# Patient Record
Sex: MALE | Race: WHITE | NOT HISPANIC OR LATINO | Employment: OTHER | URBAN - METROPOLITAN AREA
[De-identification: names, ages, dates, MRNs, and addresses within clinical notes are randomized per-mention and may not be internally consistent; named-entity substitution may affect disease eponyms.]

---

## 2018-02-09 ENCOUNTER — TRANSFERRED RECORDS (OUTPATIENT)
Dept: HEALTH INFORMATION MANAGEMENT | Facility: CLINIC | Age: 58
End: 2018-02-09

## 2018-02-22 ENCOUNTER — OFFICE VISIT (OUTPATIENT)
Dept: OTOLARYNGOLOGY | Facility: CLINIC | Age: 58
End: 2018-02-22
Payer: COMMERCIAL

## 2018-02-22 DIAGNOSIS — L98.9 SKIN LESION: Primary | ICD-10-CM

## 2018-02-22 PROCEDURE — 99204 OFFICE O/P NEW MOD 45 MIN: CPT | Performed by: OTOLARYNGOLOGY

## 2018-02-22 RX ORDER — METRONIDAZOLE 10 MG/G
GEL TOPICAL DAILY
COMMUNITY

## 2018-02-22 ASSESSMENT — PAIN SCALES - GENERAL: PAINLEVEL: NO PAIN (0)

## 2018-02-22 NOTE — NURSING NOTE
Lopez Olmos's goals for this visit include:   Chief Complaint   Patient presents with     Lesion     possible lipoma of forehead       He requests these members of his care team be copied on today's visit information: No Ref-Primary, Physician      PCP: No Ref-Primary, Physician    Referring Provider:  No referring provider defined for this encounter.    Chief Complaint   Patient presents with     Lesion     possible lipoma of forehead       Initial There were no vitals taken for this visit. There is no height or weight on file to calculate BMI.  Medication Reconciliation: complete    Do you need any medication refills at today's visit? No    Moraima Patrick RN

## 2018-02-22 NOTE — MR AVS SNAPSHOT
After Visit Summary   2/22/2018    Lopez Olmos    MRN: [id number]           Patient Information     Date Of Birth          1960        Visit Information        Provider Department      2/22/2018 10:30 AM Richie Simpson MD [facility]        Today's Diagnoses     Skin lesion    -  1       Follow-ups after your visit        Your next 10 appointments already scheduled     Feb 23, 2018  4:00 PM CST   (Arrive by 3:45 PM)   CT HEAD W/O & W CONTRAST with SHCT2   [facility] ([facility])    [address] [place] [id number]   [phone]           Please bring any scans or X-rays taken at other hospitals, if similar tests were done. Also bring a list of your medicines, including vitamins, minerals and over-the-counter drugs. It is safest to leave personal items at home.  Be sure to tell your doctor:   If you have any allergies.   If there s any chance you are pregnant.   If you are breastfeeding.    If you have diabetes as your medication may need to be adjusted for this exam.  You will have contrast for this exam. To prepare:   Do not eat or drink for 2 hours before your exam. If you need to take medicine, you may take it with small sips of water. (We may ask you to take liquid medicine as well.)   The day before your exam, drink extra fluids at least six 8-ounce glasses (unless your doctor tells you to restrict your fluids).  Patients over 70 or patients with diabetes or kidney problems:   If you haven t had a blood test (creatinine test) within the last 30 days, the Cardiologist/Radiologist may require you to get this test prior to your exam.  Please wear loose clothing, such as a sweat suit or jogging clothes. Avoid snaps, zippers and other metal. We may ask you to undress and put on a hospital gown.  If you have any questions, please call the Imaging Department where you will have your exam.            Mar 01, 2018 12:45 PM CST   Return  Visit with Richie Simpson MD   [facility] ([facility]    [address] [place] [id number] [phone]              Future tests that were ordered for you today     Open Future Orders        Priority Expected Expires Ordered    CT Head w/o & w Contrast Routine 2018            Who to contact     If you have questions or need follow up information about today's clinic visit or your schedule please contact [facility] directly at [phone].  Normal or non-critical lab and imaging results will be communicated to you by MyChart, letter or phone within 4 business days after the clinic has received the results. If you do not hear from us within 7 days, please contact the clinic through [employer] or phone. If you have a critical or abnormal lab result, we will notify you by phone as soon as possible.  Submit refill requests through e-channel or call your pharmacy and they will forward the refill request to us. Please allow 3 business days for your refill to be completed.          Additional Information About Your Visit        [email or web address] Information     e-channel is an electronic gateway that provides easy, online access to your medical records. With e-channel, you can request a clinic appointment, read your test results, renew a prescription or communicate with your care team.     To sign up for e-channel visit the website at [email or web address]   You will be asked to enter the access code listed below, as well as some personal information. Please follow the directions to create your username and password.     Your access code is: [email or web address]  Expires: 2018 11:33 AM     Your access code will  in 90 days. If you need help or a new code, please contact your [facility] or call [phone] for assistance.        Care EveryWhere ID     This is your Care EveryWhere ID. This could be used by  other organizations to access your [facility] medical records  [id number]         Blood Pressure from Last 3 Encounters:   No data found for BP    Weight from Last 3 Encounters:   No data found for Wt               Primary Care Provider Fax #    Physician No Ref-Primary [phone]       No address on file        Equal Access to Services     MACARIO [place] : Hadii aad ku hadkodyo Soomaali, waaxda luqadaha, qaybta kaalmada adeegyada, waxbenito leinin hayaan aderafia peters laritikarogelio . So [facility] [phone].    ATENCIÓN: Si habla español, tiene a horowitz disposición servicios gratuitos de asistencia lingüística. Llame al [phone].    We comply with applicable federal civil rights laws and [place] laws. We do not discriminate on the basis of race, color, national origin, age, disability, sex, sexual orientation, or gender identity.            Thank you!     Thank you for choosing [facility]  for your care. Our goal is always to provide you with excellent care. Hearing back from our patients is one way we can continue to improve our services. Please take a few minutes to complete the written survey that you may receive in the mail after your visit with us. Thank you!             Your Updated Medication List - Protect others around you: Learn how to safely use, store and throw away your medicines at www.disposemymeds.org.          This list is accurate as of 2/22/18 11:45 AM.  Always use your most recent med list.                   Brand Name Dispense Instructions for use Diagnosis    ASPIRIN PO           METROGEL 1 % gel   Generic drug:  metroNIDAZOLE      Apply topically daily        MINOCYCLINE HCL PO

## 2018-02-22 NOTE — LETTER
2/22/2018         RE: Lopez Olmos  [address] [address]        Dear Colleague,    Thank you for referring your patient, Lopez Oloms, to the [facility]. Please see a copy of my visit note below.    David Mustafa MD  [facility]  [address], [address]  [place] [address]    Dear Doctor Moon,    Thank you for asking me to see your patient, . Lopez Olmos, in consultation to evaluate his forehead lesion.  Today I had the pleasure of seeing him at the Facial Plastic and Reconstructive Surgery Clinic in the Department of Otolaryngology at [facility].    CLINICAL SUMMARY:   Diagnoses:  Right lateral forehead mass, possible lipoma or cyst.    Comorbidities: rosacea  Pertinent medications: ASA prn pain only  Photographs: [facility] consents signed February 22, 2018.   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    Care Checklist:   _CT face to determine the depth of the lesion. I was uncertain on exam whether it was attached to the underlying deep tissue and if this mass had a deeper extension.   _RTC after CT scan.       MEDICAL DECISION MAKING:   I recommend a CT scan of the mass for surgical planning to determine the depth of the lesion and further understand the risk to the facial nerve. Because it is growing, I also recommend excision of this lesion for both diagnostic and treatment purposes because of clinical uncertainty as to the exact diagnosis and recent growth.  An extensive preprocedure discussion was held.  Mr. Olmos agrees with this plan. He will undergo the CT scan and return to see me in the office to review the results and finalize our treatment plan.    It has been a pleasure to participate in the care of Mr. Olmos.  Thank you for this kind referral.      Sincerely,    Richie Simpson MD    Division of Facial Plastic and Reconstructive Surgery,   Department of  Otolaryngology  [facility]   ______________________________________________________________________                HISTORY OF PRESENT ILLNESS and SKIN LESION QUESTIONAAIRE:  As you know, Mr. Olmos is an 58 year old-year-old male who presents with a skin lesion located on the right  forehead.   He first noticed this lesion 2 years ago.    Previous biopsy of this lesion: I & D of lesion- no drainage.     Bleeding: none.   Provider- Bleeding: none.     Pain: yes- with rosacea flare.  Provider- Pain: yes when his rosacea flares up.    Color change: none.   Provider- Color change: none.     Growth: has grown 25% since first noticed.   Provider- Growth: has grown significantly over the last 2 years.     Ulceration: none.   Itching: none.  Purulence: none.   Oozing: none.   Edema: none.   Erythema: none.   History of rupture: none.   Recurrent physical trauma: none.     For lesions near the eyelid:   Restricts eyelid function: none.         SKIN QUESTIONNAIRE:   Have you had a lot of sun exposure in the past? No    Do you remember blistering sunburns anywhere on your body? Yes  Do you currently smoke?No  Provider- Do you currently smoke?No    Have you smoked in the past?No  Have you had previous skin cancers? No  Provider- Have you had previous skin cancers? No    Family history of skin cancer?Yes      REVIEW OF SYSTEMS: a 12 system review was performed by the patient care staff:    Do you currently have or have you ever had in the past:    No. Complications with sedation or anesthesia.  No. Use blood thinners. No   No. Any heart problems.   No. Chest pain.   No. A pacemaker.  No. Problems with excessive bleeding or a bleeding disorder.  No. Problems with blood clots or a clotting disorder.   No. Sleep apnea or sleep with a CPAP machine.       No. Excessive scarring.   No. Night sweats.   No. Fevers.   No. Double vision.   No. Vision loss.   No. Snoring.   No. Difficulty breathing through your nose.   No.  Runny nose.   No. Sneezing.   No. Itchy eyes.   No. Itchy throat.   No. Face pain.   No. Face weakness.   No. Face numbness.   No. Difficulty swallowing.   No. Pain with swallowing.,   No. Difficulty hearing or hearing loss.   No. Difficulty urinating.   No. Anxiety.   No. Depression.     FAMILY HISTORY:  No. Family history of excessive bleeding or a bleeding disorder.   No. Family history of blood clots or a clotting disorder.     Yes. Family history of skin cancer.    History reviewed. No pertinent family history.    PAST MEDICAL HISTORY:   Past Medical History:   Diagnosis Date     Rosacea        PAST SURGICAL HISTORY:   Past Surgical History:   Procedure Laterality Date     ORTHOPEDIC SURGERY      shoulder reconstruction       SOCIAL HISTORY:   Social History   Substance Use Topics     Smoking status: Never Smoker     Smokeless tobacco: Never Used     Alcohol use Not on file       ALLERGIES: Review of patient's allergies indicates no known allergies.    MEDICATIONS:   Current Outpatient Prescriptions   Medication Sig Dispense Refill     MINOCYCLINE HCL PO        metroNIDAZOLE (METROGEL) 1 % gel Apply topically daily       ASPIRIN PO            Patient Care Staff Signature: Moraima Patrick RN  ______________________________________________    Provider- Review of systems, FH, PMH, PSH, SH, ALL, and Medications taken by the patient care staff was reviewed by me: Richie Simpson      Provider- PHYSICAL EXAMINATION:  CONSTITUTIONAL:  No apparent distress.  Pleasant affect.  Normal ability to communicate.  CRANIOFACIAL:  Normocephalic, atraumatic.    SKIN:   location:  right  lateral forehead.  size: [id number].  height: raised.  color. Normal overlying skin. It seems to be attached to the overlying skin but also has some attachment to the deeper structures. I am unsure of the layer this lesion resides. It is non-tender.   borders: smooth.  ulceration: absent.  bleeding: absent.    EYES:  Extraocular muscles  intact.  EARS:  Normal auricles.   NOSE: No external nasal valve collapse.  Slight septal deviation.  No polyps or purulence.  ORAL CAVITY AND OROPHARYNX: no lesions on inspection.  NECK:  The parotid is soft, without masses.  Supple laryngeal landmarks.  LYMPHATIC:  No palpable lymphadenopathy.  CARDIOVASCULAR:  Carotid pulses are palpable bilaterally.  NEUROLOGIC:  Facial nerve intact.  RESPIRATORY:  Normal respiratory effort.  No stridor.  Voice strong.        Provider- PREPROCEDURE COUNSELING FOR LESION EXCISION AND CLOSURE:  An extensive preoperative discussion was held.  The patient stated he understood the risks, benefits, alternatives and limitations of the procedure.  The risks were discussed, including but not limited to: bleeding, infection, damage to surrounding structures, weakness, numbness, chronic pain, unfavorable change in his appearance, partial or total skin loss, widening of his scar, excessive scarring, extruded sutures, positive margins requiring further resection, discovery of a malignancy requiring treatment and unforeseen complications related to the procedure or anesthesia.  I described the limitation of this procedure in that a permenant scar at the surgery site(s) will always be present. The scar will be at least 3 times longer than the length of the lesion. I described how the scar will be red for many months and will hopefully fade over time.  He also understands that there may be distortion of his surrounding anatomy including his eyebrow. I emphasized the possibility of facial nerve injury and brow ptosis. The patient stated he understood these risks and limitations and elects to proceed with surgery.  He also stated he had his questions answered to his satisfaction.          Again, thank you for allowing me to participate in the care of your patient.        Sincerely,        Richie Simpson MD

## 2018-02-22 NOTE — PROGRESS NOTES
David Mustafa MD  [facility]  [address], [address]  [place] [id number]    Dear Doctor Moon,    Thank you for asking me to see your patient, . Lopez Olmos, in consultation to evaluate his forehead lesion.  Today I had the pleasure of seeing him at the Facial Plastic and Reconstructive Surgery Clinic in the Department of Otolaryngology at [facility].    CLINICAL SUMMARY:   Diagnoses:  Right lateral forehead mass, possible lipoma or cyst.    Comorbidities: rosacea  Pertinent medications: ASA prn pain only  Photographs: [facility] consents signed February 22, 2018.   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    Care Checklist:   _CT face to determine the depth of the lesion. I was uncertain on exam whether it was attached to the underlying deep tissue and if this mass had a deeper extension.   _RTC after CT scan.       MEDICAL DECISION MAKING:   I recommend a CT scan of the mass for surgical planning to determine the depth of the lesion and further understand the risk to the facial nerve. Because it is growing, I also recommend excision of this lesion for both diagnostic and treatment purposes because of clinical uncertainty as to the exact diagnosis and recent growth.  An extensive preprocedure discussion was held.  Mr. Olmos agrees with this plan. He will undergo the CT scan and return to see me in the office to review the results and finalize our treatment plan.    It has been a pleasure to participate in the care of Mr. Olmos.  Thank you for this kind referral.      Sincerely,    Richie Simpson MD    Division of Facial Plastic and Reconstructive Surgery,   Department of Otolaryngology  [facility]   ______________________________________________________________________                HISTORY OF PRESENT ILLNESS and SKIN LESION QUESTIONAAIRE:  As you know, Mr. Olmos is an 58 year old-year-old male who  presents with a skin lesion located on the right  forehead.   He first noticed this lesion 2 years ago.    Previous biopsy of this lesion: I & D of lesion- no drainage.     Bleeding: none.   Provider- Bleeding: none.     Pain: yes- with rosacea flare.  Provider- Pain: yes when his rosacea flares up.    Color change: none.   Provider- Color change: none.     Growth: has grown 25% since first noticed.   Provider- Growth: has grown significantly over the last 2 years.     Ulceration: none.   Itching: none.  Purulence: none.   Oozing: none.   Edema: none.   Erythema: none.   History of rupture: none.   Recurrent physical trauma: none.     For lesions near the eyelid:   Restricts eyelid function: none.         SKIN QUESTIONNAIRE:   Have you had a lot of sun exposure in the past? No    Do you remember blistering sunburns anywhere on your body? Yes  Do you currently smoke?No  Provider- Do you currently smoke?No    Have you smoked in the past?No  Have you had previous skin cancers? No  Provider- Have you had previous skin cancers? No    Family history of skin cancer?Yes      REVIEW OF SYSTEMS: a 12 system review was performed by the patient care staff:    Do you currently have or have you ever had in the past:    No. Complications with sedation or anesthesia.  No. Use blood thinners. No   No. Any heart problems.   No. Chest pain.   No. A pacemaker.  No. Problems with excessive bleeding or a bleeding disorder.  No. Problems with blood clots or a clotting disorder.   No. Sleep apnea or sleep with a CPAP machine.       No. Excessive scarring.   No. Night sweats.   No. Fevers.   No. Double vision.   No. Vision loss.   No. Snoring.   No. Difficulty breathing through your nose.   No. Runny nose.   No. Sneezing.   No. Itchy eyes.   No. Itchy throat.   No. Face pain.   No. Face weakness.   No. Face numbness.   No. Difficulty swallowing.   No. Pain with swallowing.,   No. Difficulty hearing or hearing loss.   No. Difficulty  urinating.   No. Anxiety.   No. Depression.     FAMILY HISTORY:  No. Family history of excessive bleeding or a bleeding disorder.   No. Family history of blood clots or a clotting disorder.     Yes. Family history of skin cancer.    History reviewed. No pertinent family history.    PAST MEDICAL HISTORY:   Past Medical History:   Diagnosis Date     Rosacea        PAST SURGICAL HISTORY:   Past Surgical History:   Procedure Laterality Date     ORTHOPEDIC SURGERY      shoulder reconstruction       SOCIAL HISTORY:   Social History   Substance Use Topics     Smoking status: Never Smoker     Smokeless tobacco: Never Used     Alcohol use Not on file       ALLERGIES: Review of patient's allergies indicates no known allergies.    MEDICATIONS:   Current Outpatient Prescriptions   Medication Sig Dispense Refill     MINOCYCLINE HCL PO        metroNIDAZOLE (METROGEL) 1 % gel Apply topically daily       ASPIRIN PO            Patient Care Staff Signature: Moraima Patrick RN  ______________________________________________    Provider- Review of systems, FH, PMH, PSH, SH, ALL, and Medications taken by the patient care staff was reviewed by me: Richie Simpson      Provider- PHYSICAL EXAMINATION:  CONSTITUTIONAL:  No apparent distress.  Pleasant affect.  Normal ability to communicate.  CRANIOFACIAL:  Normocephalic, atraumatic.    SKIN:   location:  right  lateral forehead.  size: [id number].  height: raised.  color. Normal overlying skin. It seems to be attached to the overlying skin but also has some attachment to the deeper structures. I am unsure of the layer this lesion resides. It is non-tender.   borders: smooth.  ulceration: absent.  bleeding: absent.    EYES:  Extraocular muscles intact.  EARS:  Normal auricles.   NOSE: No external nasal valve collapse.  Slight septal deviation.  No polyps or purulence.  ORAL CAVITY AND OROPHARYNX: no lesions on inspection.  NECK:  The parotid is soft, without masses.  Supple laryngeal  landmarks.  LYMPHATIC:  No palpable lymphadenopathy.  CARDIOVASCULAR:  Carotid pulses are palpable bilaterally.  NEUROLOGIC:  Facial nerve intact.  RESPIRATORY:  Normal respiratory effort.  No stridor.  Voice strong.        Provider- PREPROCEDURE COUNSELING FOR LESION EXCISION AND CLOSURE:  An extensive preoperative discussion was held.  The patient stated he understood the risks, benefits, alternatives and limitations of the procedure.  The risks were discussed, including but not limited to: bleeding, infection, damage to surrounding structures, weakness, numbness, chronic pain, unfavorable change in his appearance, partial or total skin loss, widening of his scar, excessive scarring, extruded sutures, positive margins requiring further resection, discovery of a malignancy requiring treatment and unforeseen complications related to the procedure or anesthesia.  I described the limitation of this procedure in that a permenant scar at the surgery site(s) will always be present. The scar will be at least 3 times longer than the length of the lesion. I described how the scar will be red for many months and will hopefully fade over time.  He also understands that there may be distortion of his surrounding anatomy including his eyebrow. I emphasized the possibility of facial nerve injury and brow ptosis. The patient stated he understood these risks and limitations and elects to proceed with surgery.  He also stated he had his questions answered to his satisfaction.

## 2018-02-23 ENCOUNTER — HOSPITAL ENCOUNTER (OUTPATIENT)
Dept: CT IMAGING | Facility: CLINIC | Age: 58
Discharge: HOME | End: 2018-02-23
Attending: OTOLARYNGOLOGY | Admitting: OTOLARYNGOLOGY
Payer: COMMERCIAL

## 2018-02-23 DIAGNOSIS — L98.9 SKIN LESION: ICD-10-CM

## 2018-02-23 PROCEDURE — 25000128 H RX IP 250 OP 636: Performed by: OTOLARYNGOLOGY

## 2018-02-23 PROCEDURE — 70470 CT HEAD/BRAIN W/O & W/DYE: CPT

## 2018-02-23 PROCEDURE — 25000125 ZZHC RX 250: Performed by: OTOLARYNGOLOGY

## 2018-02-23 RX ORDER — IOPAMIDOL 755 MG/ML
50 INJECTION, SOLUTION INTRAVASCULAR ONCE
Status: COMPLETED | OUTPATIENT
Start: 2018-02-23 | End: 2018-02-23

## 2018-02-23 RX ADMIN — SODIUM CHLORIDE 60 ML: 9 INJECTION, SOLUTION INTRAVENOUS at 16:56

## 2018-02-23 RX ADMIN — IOPAMIDOL 50 ML: 755 INJECTION, SOLUTION INTRAVENOUS at 16:56

## 2018-03-01 ENCOUNTER — OFFICE VISIT (OUTPATIENT)
Dept: OTOLARYNGOLOGY | Facility: CLINIC | Age: 58
End: 2018-03-01
Payer: COMMERCIAL

## 2018-03-01 DIAGNOSIS — L98.9 SKIN LESION: Primary | ICD-10-CM

## 2018-03-01 PROCEDURE — 99214 OFFICE O/P EST MOD 30 MIN: CPT | Performed by: OTOLARYNGOLOGY

## 2018-03-01 NOTE — LETTER
3/1/2018         RE: Lopez Olmos  [address] [facility] [address]        Dear Colleague,    Thank you for referring your patient, Lopez Olmos, to the [facility]. Please see a copy of my visit note below.    CLINICAL SUMMARY:   Diagnoses:  Right lateral forehead mass, possible lipoma or cyst.  -2/23/18: CT head (Addended read pending)     Comorbidities: rosacea  Pertinent medications: ASA prn pain only  Photographs: [facility] consents signed February 22, 2018.   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    Care Checklist:   _Discuss CT developmental venous anomaly with PCP.   _Awaiting addended CT reading. Over the phone Dr. Maki observed a small mass, probably a lipoma without obvious malignant features under the marker. He could not definitively determine the tissue layer (superficial or deep to the frontalis) where the mass resides.   _Offered excisional biopsy through a vertical incision with an inferior M plasty, a more limited biopsy, observation, or a second opinion. He will contact our office with his decision.           I had the pleasure of seeing Mr. Olmos at the facial plastic and reconstructive surgery clinic in the Department of Otolaryngology at the [facility].  He follows up for review of his CT scan and further treatment planning.  I spent over 30 minutes of time with Mr. Olmos and over 90% of the time was spent in counseling and coordination of care.  I reviewed the official CT reading and the images.  There appeared to be a lesion under the marker consistent with a fatty tumor.  It extended near the skull layer but did not invade the underlying bone.  I called the attending radiologist and his partner was available to discuss the case.  His partner Glynn did see the lesion under the marker without bony invasion.  He was unable to determine whether the lesion was superficial or deep to the frontalis muscle.  He was  going to attend the reading.    Mr. Olmos and I then discussed the treatment options.  I outlined 4 options at this time:  1.  The first option is complete removal through a vertical incision.  I favor a vertical incision because horizontal closure would result in possible elevation of his eyebrow.  I drew this incision on him and emphasized that the incision would be at least 3 times as long as the height of his lesion.  His lesion currently is approximately 10 x 12 mm.  I also nima a M plasty at the bottom of the incision to avoid cutting into his eyebrow.  This option would allow full sampling of the entire lesion but it does result in a scar and he may have a depression from where the space filling lesion is removed.  There is also the risk to the facial nerve with the surgical procedure and injury to the nerve would result in a permanently droopy eyebrow and inability to move the eyebrow.  He could have other complications including bleeding, infection, damage to surrounding structures, numbness, pain, and unforeseen complications related to surgery or anesthesia.  2.  The second option is a limited biopsy of the lesion.  This will be done through a much smaller incision oriented vertically.  If the lesion is below the frontalis muscle I do not expect sampling the tissue since the biopsy would not go that deep.  We also discussed the possibility of sampling error since a biopsy would only sample a portion of the lesion and would not sample the entire lesion.  Injury to the facial nerve is still a risk along with the other comp occasions described above.  In the end he would still have the majority of the mass visible on his forehead with a limited biopsy and he would have a small overlying scar.  3.  The next option is observation with follow-up if he notices any growth.  This has the disadvantage of not sampling the tissue.  But it has the advantage of creating no scar.  4.  The final option is a second  opinion from another plastic surgeon.  I offered to arrange a visit.  This would allow hearing another opinion about his diagnosis and treatment.    Mr. Olmos requested time to consider these options.  Overall he is most concerned about the scar and how this will appear.  He was leaning towards observing the lesion for about a year and understood he would come in much sooner if he noticed any growth.  He stated he clearly understood these risks and will contact our office with his decision.        CT SCAN OF THE HEAD WITHOUT AND WITH CONTRAST   2/23/2018 5:24 PM      HISTORY: Right forehead skin lesion.     TECHNIQUE:  Axial images of the head and coronal reformations without  and with 50mL Isovue-370. Radiation dose for this scan was reduced  using automated exposure control, adjustment of the mA and/or kV  according to patient size, or iterative reconstruction technique.     COMPARISON: None.     FINDINGS: A marker is placed over the right frontal scalp. No definite  underlying lesion is identified by CT scan or on the postcontrast  sequences within the cutaneous or subcutaneous tissues. No underlying  osseous erosion is appreciated.     No evidence of acute intracranial hemorrhage. No mass effect or  midline shift. Ventricular size within normal limits without  hydrocephalus. No abnormal extra-axial fluid collection. Gray-white  matter differentiation appears intact.     Branching area of enhancement in the left parietal lobe has the  appearance of a benign developmental venous anomaly. No other  suspicious areas of intracranial enhancement.     Small polypoid mucosal lesion within the left sphenoid sinus. The  remaining paranasal sinuses appear clear.         IMPRESSION:  1. No underlying mass is seen in the right frontal scalp region deep  to the marker. No definite underlying bony erosion is appreciated.  Evaluation of possible skin lesions would be better appreciated with  ultrasound if clinically  necessary.  2. No evidence of acute intracranial hemorrhage, mass, or herniation.  3. Probable developmental venous anomaly within the left parietal  lobe.     RODRIGUEZ HYLTON MD    Again, thank you for allowing me to participate in the care of your patient.        Sincerely,        Rcihie Simpson MD

## 2018-03-01 NOTE — PROGRESS NOTES
CLINICAL SUMMARY:   Diagnoses:  Right lateral forehead mass, possible lipoma or cyst.  -2/23/18: CT head (Addended read pending)     Comorbidities: rosacea  Pertinent medications: ASA prn pain only  Photographs: [facility] consents signed February 22, 2018.   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    Care Checklist:   _Discuss CT developmental venous anomaly with PCP.   _Awaiting addended CT reading. Over the phone Dr. Maki observed a small mass, probably a lipoma without obvious malignant features under the marker. He could not definitively determine the tissue layer (superficial or deep to the frontalis) where the mass resides.   _Offered excisional biopsy through a vertical incision with an inferior M plasty, a more limited biopsy, observation, or a second opinion. He will contact our office with his decision.           I had the pleasure of seeing Mr. Olmos at the facial plastic and reconstructive surgery clinic in the Department of Otolaryngology at the [facility].  He follows up for review of his CT scan and further treatment planning.  I spent over 30 minutes of time with Mr. Olmos and over 90% of the time was spent in counseling and coordination of care.  I reviewed the official CT reading and the images.  There appeared to be a lesion under the marker consistent with a fatty tumor.  It extended near the skull layer but did not invade the underlying bone.  I called the attending radiologist and his partner was available to discuss the case.  His partner Glynn did see the lesion under the marker without bony invasion.  He was unable to determine whether the lesion was superficial or deep to the frontalis muscle.  He was going to attend the reading.    Mr. Olmos and I then discussed the treatment options.  I outlined 4 options at this time:  1.  The first option is complete removal through a vertical incision.  I favor a vertical incision because horizontal  closure would result in possible elevation of his eyebrow.  I drew this incision on him and emphasized that the incision would be at least 3 times as long as the height of his lesion.  His lesion currently is approximately 10 x 12 mm.  I also nima a M plasty at the bottom of the incision to avoid cutting into his eyebrow.  This option would allow full sampling of the entire lesion but it does result in a scar and he may have a depression from where the space filling lesion is removed.  There is also the risk to the facial nerve with the surgical procedure and injury to the nerve would result in a permanently droopy eyebrow and inability to move the eyebrow.  He could have other complications including bleeding, infection, damage to surrounding structures, numbness, pain, and unforeseen complications related to surgery or anesthesia.  2.  The second option is a limited biopsy of the lesion.  This will be done through a much smaller incision oriented vertically.  If the lesion is below the frontalis muscle I do not expect sampling the tissue since the biopsy would not go that deep.  We also discussed the possibility of sampling error since a biopsy would only sample a portion of the lesion and would not sample the entire lesion.  Injury to the facial nerve is still a risk along with the other comp occasions described above.  In the end he would still have the majority of the mass visible on his forehead with a limited biopsy and he would have a small overlying scar.  3.  The next option is observation with follow-up if he notices any growth.  This has the disadvantage of not sampling the tissue.  But it has the advantage of creating no scar.  4.  The final option is a second opinion from another plastic surgeon.  I offered to arrange a visit.  This would allow hearing another opinion about his diagnosis and treatment.    Mr. Olmos requested time to consider these options.  Overall he is most concerned about the scar  and how this will appear.  He was leaning towards observing the lesion for about a year and understood he would come in much sooner if he noticed any growth.  He stated he clearly understood these risks and will contact our office with his decision.        CT SCAN OF THE HEAD WITHOUT AND WITH CONTRAST   2/23/2018 5:24 PM      HISTORY: Right forehead skin lesion.     TECHNIQUE:  Axial images of the head and coronal reformations without  and with 50mL Isovue-370. Radiation dose for this scan was reduced  using automated exposure control, adjustment of the mA and/or kV  according to patient size, or iterative reconstruction technique.     COMPARISON: None.     FINDINGS: A marker is placed over the right frontal scalp. No definite  underlying lesion is identified by CT scan or on the postcontrast  sequences within the cutaneous or subcutaneous tissues. No underlying  osseous erosion is appreciated.     No evidence of acute intracranial hemorrhage. No mass effect or  midline shift. Ventricular size within normal limits without  hydrocephalus. No abnormal extra-axial fluid collection. Gray-white  matter differentiation appears intact.     Branching area of enhancement in the left parietal lobe has the  appearance of a benign developmental venous anomaly. No other  suspicious areas of intracranial enhancement.     Small polypoid mucosal lesion within the left sphenoid sinus. The  remaining paranasal sinuses appear clear.         IMPRESSION:  1. No underlying mass is seen in the right frontal scalp region deep  to the marker. No definite underlying bony erosion is appreciated.  Evaluation of possible skin lesions would be better appreciated with  ultrasound if clinically necessary.  2. No evidence of acute intracranial hemorrhage, mass, or herniation.  3. Probable developmental venous anomaly within the left parietal  lobe.     RODRIGUEZ HYLTON MD

## 2018-03-01 NOTE — NURSING NOTE
Lopez Olmos's goals for this visit include: Follow up CT Results  He requests these members of his care team be copied on today's visit information: NO    PCP: No Ref-Primary, Physician    Referring Provider:  No referring provider defined for this encounter.    Chief Complaint   Patient presents with     RECHECK     Discuss CT Results       Initial There were no vitals taken for this visit. There is no height or weight on file to calculate BMI.  Medication Reconciliation: complete    Do you need any medication refills at today's visit? NO

## 2018-03-01 NOTE — MR AVS SNAPSHOT
After Visit Summary   3/1/2018    Lopez Olmos    MRN: [id number]           Patient Information     Date Of Birth          1960        Visit Information        Provider Department      3/1/2018 12:45 PM Richie Simpson MD [facility]        Today's Diagnoses     Skin lesion    -  1       Follow-ups after your visit        Follow-up notes from your care team     Return in about 6 months (around 2018).      Who to contact     If you have questions or need follow up information about today's clinic visit or your schedule please contact [facility] directly at [phone].  Normal or non-critical lab and imaging results will be communicated to you by MyChart, letter or phone within 4 business days after the clinic has received the results. If you do not hear from us within 7 days, please contact the clinic through MyChart or phone. If you have a critical or abnormal lab result, we will notify you by phone as soon as possible.  Submit refill requests through [employer] or call your pharmacy and they will forward the refill request to us. Please allow 3 business days for your refill to be completed.          Additional Information About Your Visit        MyChart Information     [employer] is an electronic gateway that provides easy, online access to your medical records. With [employer], you can request a clinic appointment, read your test results, renew a prescription or communicate with your care team.     To sign up for [employer] visit the website at [email or web address]   You will be asked to enter the access code listed below, as well as some personal information. Please follow the directions to create your username and password.     Your access code is: [email or web address]  Expires: 2018 11:33 AM     Your access code will  in 90 days. If you need help or a new code, please contact your [facility] or call [phone] for  assistance.        Care EveryWhere ID     This is your Care EveryWhere ID. This could be used by other organizations to access your [place] medical records  [id number]         Blood Pressure from Last 3 Encounters:   No data found for BP    Weight from Last 3 Encounters:   No data found for Wt              Today, you had the following     No orders found for display       Primary Care Provider Fax #    Physician No Ref-Primary [phone]       No address on file        Equal Access to Services     RAY FRANCE : Hadii aad ku hadasho Soomaali, waaxda luqadaha, qaybta kaalmada adeegyada, waxay idiin haysavanan adeeg khradhash laritikan . So [facility] [phone].    ATENCIÓN: Si habla español, tiene a hoorwitz disposición servicios gratuitos de asistencia lingüística. Llame al [phone].    We comply with applicable federal civil rights laws and [place] laws. We do not discriminate on the basis of race, color, national origin, age, disability, sex, sexual orientation, or gender identity.            Thank you!     Thank you for choosing [facility]  for your care. Our goal is always to provide you with excellent care. Hearing back from our patients is one way we can continue to improve our services. Please take a few minutes to complete the written survey that you may receive in the mail after your visit with us. Thank you!             Your Updated Medication List - Protect others around you: Learn how to safely use, store and throw away your medicines at www.disposemymeds.org.          This list is accurate as of 3/1/18  2:19 PM.  Always use your most recent med list.                   Brand Name Dispense Instructions for use Diagnosis    ASPIRIN PO           METROGEL 1 % gel   Generic drug:  metroNIDAZOLE      Apply topically daily        MINOCYCLINE HCL PO

## 2020-11-06 ENCOUNTER — APPOINTMENT (OUTPATIENT)
Dept: URBAN - METROPOLITAN AREA CLINIC 257 | Age: 60
Setting detail: DERMATOLOGY
End: 2020-11-06

## 2020-11-06 DIAGNOSIS — L01.01 NON-BULLOUS IMPETIGO: ICD-10-CM

## 2020-11-06 DIAGNOSIS — L71.8 OTHER ROSACEA: ICD-10-CM

## 2020-11-06 DIAGNOSIS — L70.8 OTHER ACNE: ICD-10-CM

## 2020-11-06 DIAGNOSIS — L57.0 ACTINIC KERATOSIS: ICD-10-CM

## 2020-11-06 PROCEDURE — 99213 OFFICE O/P EST LOW 20 MIN: CPT | Mod: 25

## 2020-11-06 PROCEDURE — 17000 DESTRUCT PREMALG LESION: CPT

## 2020-11-06 PROCEDURE — OTHER COUNSELING: OTHER

## 2020-11-06 PROCEDURE — OTHER ACNE SURGERY: OTHER

## 2020-11-06 PROCEDURE — OTHER LIQUID NITROGEN: OTHER

## 2020-11-06 PROCEDURE — 10040 ACNE SURGERY: CPT

## 2020-11-06 PROCEDURE — OTHER PRESCRIPTION: OTHER

## 2020-11-06 RX ORDER — MINOCYCLINE HYDROCHLORIDE 50 MG/1
CAPSULE ORAL
Qty: 60 | Refills: 5 | Status: ERX | COMMUNITY
Start: 2020-11-06

## 2020-11-06 RX ORDER — MUPIROCIN 20 MG/G
OINTMENT TOPICAL
Qty: 1 | Refills: 3 | Status: ERX | COMMUNITY
Start: 2020-11-06

## 2020-11-06 RX ORDER — METRONIDAZOLE 7.5 MG/G
CREAM TOPICAL
Qty: 1 | Refills: 11 | Status: ERX | COMMUNITY
Start: 2020-11-06

## 2020-11-06 ASSESSMENT — LOCATION DETAILED DESCRIPTION DERM
LOCATION DETAILED: RIGHT SUPERIOR PARIETAL SCALP
LOCATION DETAILED: POSTERIOR MID-PARIETAL SCALP
LOCATION DETAILED: LEFT MEDIAL FOREHEAD
LOCATION DETAILED: LEFT INFERIOR TEMPLE
LOCATION DETAILED: RIGHT SUPERIOR CENTRAL MALAR CHEEK

## 2020-11-06 ASSESSMENT — LOCATION SIMPLE DESCRIPTION DERM
LOCATION SIMPLE: LEFT FOREHEAD
LOCATION SIMPLE: RIGHT CHEEK
LOCATION SIMPLE: LEFT TEMPLE
LOCATION SIMPLE: POSTERIOR SCALP
LOCATION SIMPLE: SCALP

## 2020-11-06 ASSESSMENT — LOCATION ZONE DERM
LOCATION ZONE: FACE
LOCATION ZONE: SCALP

## 2020-11-06 NOTE — HPI: RASH (ROSACEA)
How Severe Is Your Rosacea?: moderate
Is This A New Presentation, Or A Follow-Up?: Follow Up Rosacea
Additional History: Patient is also here for a minocycline prescription \\n

## 2020-11-06 NOTE — PROCEDURE: LIQUID NITROGEN
Number Of Freeze-Thaw Cycles: 1 freeze-thaw cycle
Render Post-Care Instructions In Note?: no
Consent: The patient's consent was obtained including but not limited to risks of crusting, scabbing, blistering, scarring, darker or lighter pigmentary change, recurrence, incomplete removal and infection.
Detail Level: Simple
Post-Care Instructions: I reviewed with the patient in detail post-care instructions. Patient is to wear sunprotection, and avoid picking at any of the treated lesions. Pt may apply Vaseline to crusted or scabbing areas.
Duration Of Freeze Thaw-Cycle (Seconds): 1

## 2021-12-16 ENCOUNTER — APPOINTMENT (OUTPATIENT)
Dept: URBAN - METROPOLITAN AREA CLINIC 257 | Age: 61
Setting detail: DERMATOLOGY
End: 2021-12-16

## 2021-12-16 DIAGNOSIS — D18.0 HEMANGIOMA: ICD-10-CM

## 2021-12-16 DIAGNOSIS — L81.4 OTHER MELANIN HYPERPIGMENTATION: ICD-10-CM

## 2021-12-16 DIAGNOSIS — D22 MELANOCYTIC NEVI: ICD-10-CM

## 2021-12-16 DIAGNOSIS — L82.1 OTHER SEBORRHEIC KERATOSIS: ICD-10-CM

## 2021-12-16 DIAGNOSIS — Z71.89 OTHER SPECIFIED COUNSELING: ICD-10-CM

## 2021-12-16 DIAGNOSIS — L71.8 OTHER ROSACEA: ICD-10-CM

## 2021-12-16 DIAGNOSIS — L01.01 NON-BULLOUS IMPETIGO: ICD-10-CM

## 2021-12-16 DIAGNOSIS — L57.8 OTHER SKIN CHANGES DUE TO CHRONIC EXPOSURE TO NONIONIZING RADIATION: ICD-10-CM

## 2021-12-16 PROBLEM — D22.5 MELANOCYTIC NEVI OF TRUNK: Status: ACTIVE | Noted: 2021-12-16

## 2021-12-16 PROBLEM — D18.01 HEMANGIOMA OF SKIN AND SUBCUTANEOUS TISSUE: Status: ACTIVE | Noted: 2021-12-16

## 2021-12-16 PROCEDURE — OTHER MIPS QUALITY: OTHER

## 2021-12-16 PROCEDURE — OTHER PRESCRIPTION: OTHER

## 2021-12-16 PROCEDURE — OTHER COUNSELING: OTHER

## 2021-12-16 PROCEDURE — 99214 OFFICE O/P EST MOD 30 MIN: CPT

## 2021-12-16 PROCEDURE — OTHER PRESCRIPTION MEDICATION MANAGEMENT: OTHER

## 2021-12-16 RX ORDER — METRONIDAZOLE 7.5 MG/G
GEL TOPICAL
Qty: 45 | Refills: 6 | Status: ERX | COMMUNITY
Start: 2021-12-16

## 2021-12-16 RX ORDER — MUPIROCIN 20 MG/G
OINTMENT TOPICAL
Qty: 22 | Refills: 1 | Status: ERX | COMMUNITY
Start: 2021-12-16

## 2021-12-16 ASSESSMENT — LOCATION SIMPLE DESCRIPTION DERM
LOCATION SIMPLE: RIGHT UPPER BACK
LOCATION SIMPLE: LEFT FOREHEAD
LOCATION SIMPLE: POSTERIOR SCALP
LOCATION SIMPLE: UPPER BACK
LOCATION SIMPLE: LEFT UPPER BACK
LOCATION SIMPLE: SCALP

## 2021-12-16 ASSESSMENT — LOCATION DETAILED DESCRIPTION DERM
LOCATION DETAILED: POSTERIOR MID-PARIETAL SCALP
LOCATION DETAILED: LEFT MEDIAL FOREHEAD
LOCATION DETAILED: RIGHT SUPERIOR MEDIAL UPPER BACK
LOCATION DETAILED: LEFT MEDIAL UPPER BACK
LOCATION DETAILED: INFERIOR THORACIC SPINE
LOCATION DETAILED: RIGHT SUPERIOR PARIETAL SCALP
LOCATION DETAILED: LEFT INFERIOR MEDIAL UPPER BACK

## 2021-12-16 ASSESSMENT — LOCATION ZONE DERM
LOCATION ZONE: SCALP
LOCATION ZONE: FACE
LOCATION ZONE: TRUNK

## 2021-12-16 NOTE — PROCEDURE: PRESCRIPTION MEDICATION MANAGEMENT
Detail Level: Zone
Render In Strict Bullet Format?: No
Plan: Patient can call in for minocycline 50mg capsule daily if worsens or flares.

## 2021-12-16 NOTE — PROCEDURE: MIPS QUALITY
Detail Level: Generalized
Quality 110: Preventive Care And Screening: Influenza Immunization: Influenza Immunization Ordered or Recommended, but not Administered due to system reason
Quality 431: Preventive Care And Screening: Unhealthy Alcohol Use - Screening: Patient not identified as an unhealthy alcohol user when screened for unhealthy alcohol use using a systematic screening method
Quality 130: Documentation Of Current Medications In The Medical Record: Current Medications Documented
Quality 226: Preventive Care And Screening: Tobacco Use: Screening And Cessation Intervention: Patient screened for tobacco use and is an ex/non-smoker

## 2022-01-04 NOTE — PROGRESS NOTES
CLINICAL SUMMARY:   Diagnoses:  Right lateral forehead mass, possible lipoma or cyst.  -2/23/18: CT head- There is a suggestion of a small fat density lesion underneath the marker measuring approximately 4 mm in AP thickness and 7 mm in width. This probably without represents a lipoma although it is always difficult to exclude a low-grade liposarcoma. On image 6, it appears to be anterior to the frontalis muscle.     Comorbidities: rosacea  Pertinent medications: ASA prn pain only  Photographs: [facility] consents signed February 22, 2018.   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    Care Checklist:   _Discuss left parietal lobe CT developmental venous anomaly with PCP. Update 1/6/22: he did not talk with his PCP and I again encouraged him to discuss it with his PCP because I am unsure of the significance.   _Again offered excisional biopsy through a vertical incision with an inferior M plasty, a more limited biopsy, observation, or a second opinion. He has opted to go ahead with surgical excision with the expected outcomes.   _Local anesthesia in the ASC.       Mr. Olmos returns today. He did not perform the surgery earlier because of the death of a friend and other life circumstances. He has thought about getting the procedure and has had family members point out the mass on his forehead. He reports it has grown a small amount and is more raised. No pain or bleeding. No other symptoms. On exam, NAD, no stridor, voice strong. Right forehead mass is 2.5x2.5cm, non-tender, mobile, with normal overlying skin. CT scan from 2018 was reviewed.     MDM: He has had time to consider his options from before and now wants surgery. We reviewed his alternatives including  a more limited biopsy, observation, or a second opinion. He wants to go ahead with surgery under local anesthesia.     PREOPERATIVE COUNSELING: An extensive preoperative discussion was held. The patient stated he understood the risks, benefits, alternatives and  limitations of the procedure. The risks including but not limited to bleeding, infection, damage to surrounding structures, numbness, weakness, dimpling or depression of the skin, regrowth, chronic pain, poor aesthetic result, partial or total skin loss, distortion of surrounding facial structures, and unforeseen complications related to surgery or anesthesia were described. He also understands the possibility of distortion of surrounding facial structures including his eyebrow which may result in eyebrow or elevation. We discussed how additional surgeries may be needed to obtain an optimal result.    I described how no reconstructive effort will be able to restore him to his condition prior to the mass growing. We also acknowledged that facial asymmetries will be present after the reconstruction. The patient stated he had his questions answered to his satisfaction.    We will initiate surgery scheduling.   Richie Simpson MD

## 2022-01-06 ENCOUNTER — PREP FOR PROCEDURE (OUTPATIENT)
Dept: OTOLARYNGOLOGY | Facility: CLINIC | Age: 62
End: 2022-01-06

## 2022-01-06 ENCOUNTER — OFFICE VISIT (OUTPATIENT)
Dept: OTOLARYNGOLOGY | Facility: CLINIC | Age: 62
End: 2022-01-06
Payer: COMMERCIAL

## 2022-01-06 VITALS — DIASTOLIC BLOOD PRESSURE: 113 MMHG | OXYGEN SATURATION: 97 % | SYSTOLIC BLOOD PRESSURE: 150 MMHG | HEART RATE: 102 BPM

## 2022-01-06 DIAGNOSIS — L98.9 SKIN LESION: Primary | ICD-10-CM

## 2022-01-06 PROCEDURE — 99203 OFFICE O/P NEW LOW 30 MIN: CPT | Performed by: OTOLARYNGOLOGY

## 2022-01-06 NOTE — LETTER
1/6/2022         RE: Lopez Olmos  [address] [address]        Dear Colleague,    Thank you for referring your patient, Lopez Olmos, to the [facility]. Please see a copy of my visit note below.    CLINICAL SUMMARY:   Diagnoses:  Right lateral forehead mass, possible lipoma or cyst.  -2/23/18: CT head- There is a suggestion of a small fat density lesion underneath the marker measuring approximately 4 mm in AP thickness and 7 mm in width. This probably without represents a lipoma although it is always difficult to exclude a low-grade liposarcoma. On image 6, it appears to be anterior to the frontalis muscle.     Comorbidities: rosacea  Pertinent medications: ASA prn pain only  Photographs: [facility] consents signed February 22, 2018.   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    Care Checklist:   _Discuss left parietal lobe CT developmental venous anomaly with PCP. Update 1/6/22: he did not talk with his PCP and I again encouraged him to discuss it with his PCP because I am unsure of the significance.   _Again offered excisional biopsy through a vertical incision with an inferior M plasty, a more limited biopsy, observation, or a second opinion. He has opted to go ahead with surgical excision with the expected outcomes.   _Local anesthesia in the ASC.       Mr. Olmos returns today. He did not perform the surgery earlier because of the death of a friend and other life circumstances. He has thought about getting the procedure and has had family members point out the mass on his forehead. He reports it has grown a small amount and is more raised. No pain or bleeding. No other symptoms. On exam, NAD, no stridor, voice strong. Right forehead mass is 2.5x2.5cm, non-tender, mobile, with normal overlying skin. CT scan from 2018 was reviewed.     MDM: He has had time to consider his options from before and now wants surgery. We reviewed his alternatives including  a more  limited biopsy, observation, or a second opinion. He wants to go ahead with surgery under local anesthesia.     PREOPERATIVE COUNSELING: An extensive preoperative discussion was held. The patient stated he understood the risks, benefits, alternatives and limitations of the procedure. The risks including but not limited to bleeding, infection, damage to surrounding structures, numbness, weakness, dimpling or depression of the skin, regrowth, chronic pain, poor aesthetic result, partial or total skin loss, distortion of surrounding facial structures, and unforeseen complications related to surgery or anesthesia were described. He also understands the possibility of distortion of surrounding facial structures including his eyebrow which may result in eyebrow or elevation. We discussed how additional surgeries may be needed to obtain an optimal result.    I described how no reconstructive effort will be able to restore him to his condition prior to the mass growing. We also acknowledged that facial asymmetries will be present after the reconstruction. The patient stated he had his questions answered to his satisfaction.    We will initiate surgery scheduling.   Richie Simpson MD           Again, thank you for allowing me to participate in the care of your patient.        Sincerely,        Richie Simpson MD

## 2022-01-06 NOTE — NURSING NOTE
Lopez Olmos's goals for this visit include:   Chief Complaint   Patient presents with     Follow Up     Right lateral forehead mass, possible lipoma or cyst.       He requests these members of his care team be copied on today's visit information: frank    PCP: No Ref-Primary, Physician    Referring Provider:  No referring provider defined for this encounter.    @[facility]@

## 2022-01-25 DIAGNOSIS — Z11.59 ENCOUNTER FOR SCREENING FOR OTHER VIRAL DISEASES: Primary | ICD-10-CM

## 2022-01-27 RX ORDER — CEFAZOLIN SODIUM 2 G/100ML
2 INJECTION, SOLUTION INTRAVENOUS SEE ADMIN INSTRUCTIONS
Status: CANCELLED | OUTPATIENT
Start: 2022-01-27

## 2022-01-27 RX ORDER — DEXAMETHASONE SODIUM PHOSPHATE 10 MG/ML
10 INJECTION, SOLUTION INTRAMUSCULAR; INTRAVENOUS ONCE
Status: CANCELLED | OUTPATIENT
Start: 2022-01-27 | End: 2022-01-27

## 2022-01-27 RX ORDER — CEFAZOLIN SODIUM 2 G/100ML
2 INJECTION, SOLUTION INTRAVENOUS
Status: CANCELLED | OUTPATIENT
Start: 2022-01-27

## 2022-01-27 ASSESSMENT — MIFFLIN-ST. JEOR: SCORE: 1677.16

## 2022-02-03 ENCOUNTER — HOSPITAL ENCOUNTER (OUTPATIENT)
Facility: AMBULATORY SURGERY CENTER | Age: 62
Discharge: HOME | End: 2022-02-03
Attending: OTOLARYNGOLOGY | Admitting: OTOLARYNGOLOGY
Payer: COMMERCIAL

## 2022-02-03 VITALS
HEIGHT: 70 IN | RESPIRATION RATE: 18 BRPM | OXYGEN SATURATION: 96 % | DIASTOLIC BLOOD PRESSURE: 83 MMHG | TEMPERATURE: 98.1 F | HEART RATE: 83 BPM | WEIGHT: 192 LBS | SYSTOLIC BLOOD PRESSURE: 128 MMHG | BODY MASS INDEX: 27.49 KG/M2

## 2022-02-03 DIAGNOSIS — L98.9 SKIN LESION: ICD-10-CM

## 2022-02-03 PROCEDURE — 13132 CMPLX RPR F/C/C/M/N/AX/G/H/F: CPT

## 2022-02-03 PROCEDURE — 88304 TISSUE EXAM BY PATHOLOGIST: CPT | Performed by: PATHOLOGY

## 2022-02-03 PROCEDURE — G8907 PT DOC NO EVENTS ON DISCHARG: HCPCS

## 2022-02-03 PROCEDURE — 21014 EXC FACE TUM DEEP 2 CM/>: CPT | Performed by: OTOLARYNGOLOGY

## 2022-02-03 PROCEDURE — 11443 EXC FACE-MM B9+MARG 2.1-3 CM: CPT

## 2022-02-03 PROCEDURE — G8916 PT W IV AB GIVEN ON TIME: HCPCS

## 2022-02-03 RX ORDER — LIDOCAINE HYDROCHLORIDE AND EPINEPHRINE 10; 10 MG/ML; UG/ML
INJECTION, SOLUTION INFILTRATION; PERINEURAL PRN
Status: DISCONTINUED | OUTPATIENT
Start: 2022-02-03 | End: 2022-02-03 | Stop reason: HOSPADM

## 2022-02-03 RX ORDER — OXYCODONE HYDROCHLORIDE 5 MG/1
5 TABLET ORAL EVERY 6 HOURS PRN
Qty: 5 TABLET | Refills: 0 | Status: SHIPPED | OUTPATIENT
Start: 2022-02-03 | End: 2022-02-06

## 2022-02-03 RX ORDER — MUPIROCIN 20 MG/G
OINTMENT TOPICAL
Qty: 22 G | Refills: 1 | Status: SHIPPED | OUTPATIENT
Start: 2022-02-03 | End: 2022-03-31

## 2022-02-03 RX ORDER — MUPIROCIN 20 MG/G
OINTMENT TOPICAL PRN
Status: DISCONTINUED | OUTPATIENT
Start: 2022-02-03 | End: 2022-02-03 | Stop reason: HOSPADM

## 2022-02-03 NOTE — BRIEF OP NOTE
[facility] Brief Operative Note    Pre-operative diagnosis: Skin lesion [L98.9]   Post-operative diagnosis right forehead mass    Procedure: Procedure(s):  Right forehead lesion excision, complex closure   Surgeon(s): Surgeon(s) and Role:     * Richie Simpson MD - Primary   Estimated blood loss: * No values recorded between 2/3/2022 12:50 PM and 2/3/2022  1:45 PM *    Specimens: ID Type Source Tests Collected by Time Destination   1 : RIGHT FOREHEAD MASS AND OVERLYING SKIN Tissue Face SURGICAL PATHOLOGY EXAM Richie Simpson MD 2/3/2022  1:13 PM       Findings: Fatty mass consistent with lipoma

## 2022-02-03 NOTE — DISCHARGE INSTRUCTIONS
Patient Instructions for Facial Discharge    Richie Simpson MD    Please read and familiarize yourself with these instructions. By following them carefully, you will assist in obtaining the best possible result from your surgery. If questions arise, do not hesitate to contact with me and discuss your questions at any time.     Surgery Site Care:    [x] Incision care (for incisions away from the eyes): Apply the antibiotic ointment Bactroban (mupirocin) to all of your incisions every 2 hours while you are awake. The incisions should be covered at all times by ointment because new skin cells grow best across moist surfaces. Use the Bactroban ointment for 3 days. On the fourth day, stop using the Bactroban and switch to Vaseline ointment. Continue to apply the Vaseline ointment every 2 hours to your incisions while you are awake. The switch to Vasoline is made because occasionally patients can develop an allergy to Bactoban if used for a long time.     [] Incision care (for incisions around the eyes):  For incisions near the eyes, apply the antibiotic ointment Erythromycin ophthalmic to all of your incisions every 2 hours while you are awake. Erythromycin ophthalmic ointment is safe to have near or in your eyes. The incisions should be covered at all times by the ointment because skin cells grow best across moist surfaces.     [] Crusting: Avoid crust build-up along your incisions. If crusts form, apply ointment more frequently. Crust build-up is a sign that your incisions or wounds are too dry.    [] Bolster: A bolster has been applied to your reconstructive site to immobilize the area and promote healing. The bolster is usually removed at the first or second postoperative visit. It is very important to avoid bumping the bolster. Any trauma to the bolster may damage the skin graft. Apply the antibiotic ointment Bactroban (mupirocin) to the bolster every 2 hours while you are awake. Use the Bactroban ointment for 3  days. On the fourth day, stop using the Bactroban and switch to Vaseline ointment. Continue to apply the Vaseline ointment every 2 hours to your incisions while you are awake. The switch to Vaseline is made because occasionally patients can develop an allergy to Bactroban if used for a long time. If the bolster is near your eye, do not use Bactroban or Vasoline near the eye; rather use Erythromycin ointment on the part of the bolster near your eye. The bolster should be covered at all times by the ointment. Do not get the bolster wet in the shower or bath.      [] Granulation: Some areas of your face were left open to allow healing from the inside-out, a process known as granulation. To promote healing, the open area should be covered at all times by ointment. If the open area drys out, it will not heal properly. Apply the Bactroban ointment every 2 hours while you are awake to this area for 3 days. On the fourth day, stop using the Bactroban and switch to Vaseline ointment. Continue to apply the Vasoline oointment every 2 hours to the open areas while you are awake     [] Full Thickness Skin Graft Donor Site: A skin graft was removed from your ear, neck, face, leg, or groin. It has been partially or fully closed with sutures.     [] Steri-strips (wound tape) have been applied to the area. Keep this tape in place and dry until you see Dr. Simpson.     [] Please keep this area moist at all times. Apply the antibiotic ointment Bactroban to this donor area every 2 hours while you are awake. Use the Bactroban ointment for 3 days. On the fourth day, stop using the Bactroban and switch to Vaseline ointment. Continue to apply the Vasoline ointment every 2 hours to the donor area while you are awake until you see Dr. Simpson.    [] Split Thickness Skin Graft Donor Site: A skin graft was removed from your leg, arm, or waist. It is covered with a clear dressing. Bloody fluid will collect under the dressing. This fluid helps  promote healing. Try to keep the dressing and collected fluid in place for as long as possible. Do not disturb the dressing or get the dressing wet. If the fluid drains out, do not remove the dressing. Simply tape gauze over the leaking area to catch the fluid and keep your clothes clean. If there is concern about the dressing or healing of the skin graft site, please call our office.     [] Ear Cartilage Tilden: Ear cartilage was removed from one or both of your ears and used to strengthen and shape your reconstruction. A gauze bolster has been sewn onto your ear to immobilize the area and promote healing. The bolster is usually removed at the first or second postoperative visit. It is very important to avoid bumping the bolster. Apply the antibiotic ointment Bactroban to the bolster every 2 hours while you are awake. Use the Bactroban ointment for 3 days. On the fourth day, stop using the Bactroban and switch to Vaseline ointment. Continue to apply the Vaseline ointment every 2 hours to your incisions while you are awake. The bolster should be covered at all times by the ointment. Do not get the bolster wet in the shower or bath.     [] Rib Cartilage Tilden: Rib cartilage was removed from your chest and used to strengthen and your reconstruction. Steri-strips are across the incision. You may get these strips wet in the shower 72 hours after surgery. They will eventually fall off on their own.     [x] Pressure Dressing: A pressure dressing was applied to your forehead    [x] Remove this pressure dressing Saturday morning.     [] Keep this pressure dressing in place until your next visit to our office.    [] Drain: A drain was placed in your surgical site.  If this drain was removed in the hospital, leaking of fluid out of the wound where the drain exited is expected. You may get this area wet in the shower 72 hours after removal of the drain. Please call our office if the drainage is foul smelling or the site  becomes more red or painful. If the drain has not been removed, please call ____to arrange removal of the drain on ________     [] Ice Packs:     [] Apply ice packs to your eyes and around the surgical site during the first 24 hours. The packs can rest gently on the surgery site but avoid traumatizing the surgical areas. Either a commercially available ice pack from your pharmacy or crushed ice in a plastic bag covered with a cloth may be used. Do not let the skin get too cool by keeping a cloth between the ice pack and your skin.     [] No Ice Packs: the cold could damage your skin.    Bathing Instructions:      Showering:    [] Do NOT get your surgery site(s) wet until approved by Dr. Simpson. You may take a tub bath after surgery but you must keep your surgery site(s) dry.     [x] You may shower and wash your hair 72 hours after surgery. Use hypoallergenic shampoo (baby shampoo). Let warm, soapy shower water run over your face and incisions. Do not scrub or bump your surgery site(s). Gently pat your surgery site(s) dry with a towel. Apply ointment over your surgery sites after drying.    []  For patients with a bolster: You may wash your face, but carefully avoid the bolster. You may take a tub bath starting 48 hours after surgery but you must keep your bolster dry. Do NOT get the bolster wet.       Bath: You may take a bath 48 hours after surgery, but do not get any incisions wet in the bath for 6 weeks.     Facial Care Instructions:      Brushing:    [x] You may brush your teeth normally.  [] Brush your teeth gently with a soft small toothbrush.      Shave:    [x] You may shave your entire face as normal.  [] Refrain from shaving around the surgical site until approved by Dr. Simpson.    Makeup:    [] Resume normal makeup use.  [] Do not wear makeup until approved by Dr. Simpson.      Lipstick:  [] Resume normal lipstick use.  [] Do not use lipstick for one week. Gently coat lips with Vaseline if needed to treat  dryness.      Facial movement:  [x] Resume your normal facial movements and speaking.  [] Avoid smiling, grinning, or excessive facial movement for one week.  [] Avoid long conversations or speaking on the phone for one week.  [] Avoid turning your head from side-to-side or up-and-down.      Hair dryer:  [] You may use a hair dryer as usual.  [x] Use a hair dryer on COOL setting only since you may not have full sensation in the operative areas.      Hair care:  [] You may comb and color your hair as usual.  [x] Be careful when combing your hair to avoid catching your comb in the suture line.  [x] Hair coloring and permanents should be postponed until 4 weeks after surgery.    Activities:      Eating: Avoid foods that require prolonged chewing. Otherwise, your diet has no restrictions.      No smoking. Tobacco or any product with nicotine (patch or gum) can severely impair the healing process and result in a poor surgical result.      Alcohol. No alcohol consumption until all bandages and sutures have been removed and you have fully recovered from surgery.      Rest. Obtain more rest than usual.      Strenuous activities. Avoid any straining, bending, lifting over 15 lbs. and other activities that will raise your blood pressure or pulse because this may cause unnecessary bleeding. Do not resume your activities until approved by Dr. Simpson. You can usually resume light activities 1 week after your surgery, but you must continue to avoid any activity that will raise your blood pressure or pulse because this may cause unnecessary bleeding. Four weeks after surgery you can usually begin to slowly resume your usual activities with the goal of returning to all activities 6 weeks after surgery.      Head elevated. Keep your head elevated to reduce swelling and drainage. When resting, lie on 3 pillows or sleep in a recliner.       Driving. Do not drive until you have stopped all pain medications, are pain free, and can turn  your head fully in all directions.      Avoid trauma. Be careful not to bump your surgery site(s).      Sun protection. Avoid sun or sunlamps for 6 weeks after surgery. Heat may cause your surgery site to swell. After that, please wear a hat and use sunscreen when exposed to sunlight (SPF of 30 or greater is recommended).      Swimming. Do not go swimming for 6 weeks.      Travel. Avoid travel for 6 weeks after surgery.      Medication Instructions:     [x] Tylenol: Please take over-the-counter Tylenol (acetaminophen) for pain as instructed on the packaging. Never take more Tylenol than the packaging says is safe.      [x] Ibuprofen: You can also take over-the-counter Ibuprofen (Motrin, Advil) for pain as instructed on the packaging. Never take more Ibuprofen than the packaging says is safe.     [x] Moderate to Severe Pain: A prescription pain medication has been prescribed. Only take this prescription pain medication if you are experiencing moderate to serve pain despite taking over the counter pain medications like Tylenol or Ibuprofen. Take this prescription medication as needed according to your pharmacist's instructions.     [] Avoid Excessive Tylenol: The prescription pain medication that was prescribed after surgery or that you already take has Tylenol in it. Taking this prescription pain medication and over-the-counter Tylenol may result in Tylenol overdose and damage to your liver. Avoid too much Tylenol. If questions arise about pain management call Dr. Simpson or talk with your pharmacist.     [x] Avoid Aspirin: Do not take Aspirin or pain medications that contain Aspirin for 7 days after surgery. These medications will thin your blood and may cause a hematoma or excessive bleeding.     [] Avoid NSAIDs: Do not take Non-Steroidal Anti-Inflammatory Drugs (NSAIDs) such as Advil, Excedrine, Ibuprofen, Alieve, Naproxen, etc  These medications will thin your blood and may cause a hematoma or excessive  bleeding     [] Antibiotics: Antibiotics have been prescribed to prevent infection. Take the antibiotics according to your pharmacist's instructions.     [] Blood Thinners: You usually take a blood thinner. Your primary care provider or specialist who prescribes your blood thinners has created an anticoagulation plan for you around the time of your surgery. Please follow those instructions.       Your blood thinning medication plan includes: ________.    If your primary care provider feels that you need to restart your blood thinner before the planned restarting date, please contact Dr. Simpson or his office staff immediately. If you do not understand what to do with your blood thinners, call our office, or Dr. Simpson immediately!    [x] Resume your usual medications unless otherwise instructed.    [] Other: _________________________________________________________      What to Expect During Healing:      Bleeding: There may be a fair amount of bloody fluid oozing from your surgery site. You will be applying ointment frequently to the surgery sites. As the ointment heats to your body temperature, it becomes runny and mixes with bloody secretions to form a pink colored fluid. This is very common and slows down during the first 3-5 days after surgery. Also during the first 72 hours, drips of blood commonly form at your surgery site. Continue to apply ointment to prevent those drips from hardening into crusts. If you have bright red blood constantly dripping or pouring down your face, please call Dr. Simpson, Dr. Simpson's office, 911, or go to your nearest Emergency Department.      Bruising and Swelling: Bruising or swelling around the surgery site is common and it can extend well into the face and neck. If you have rapid swelling or bruising, or areas that turn hard and dark purple, please call Dr. Simpson or his office immediately. Mild bruising will usually dissipate in several weeks. In certain patients it may require 6  months for all swelling to subside completely. Be aware that when you expose a bruise to sunlight it can cause permanent discoloration to the skin. Please wear a hat and use sunscreen when exposed to sunlight (SPF of 30 or greater is recommended).       Discomfort: There will be discomfort after surgery, most of which will be within 1-2 days after the procedure. After the first 48 hours, your pain should slowly start to improve. If after this time you experience significantly worsening pain, please call Dr. Simpson's office or Dr. Simpson immediately.      Redness: The surgery site may remain pink for one year as new blood vessels grow into the area to help it heal. This redness will likely fade over time.       Numbness: It is normal to experience some numbness near the incisions. This could last several months but usually resolves over time.      Buried Sutures: On occasion a suture under the skin can resurface a month or two later. If this happens, please call and we will schedule an appointment to have it removed.      Tightness: Your surgical site may feel tight after the procedure. On average, it takes 1 to 6 months for the skin to start to relax, and one complete year for full relaxation of the skin. This will vary with each patient.    Please call the office or Dr. Simpson directly if you have a fever over 101?F, excessive swelling, vision changes, severe pain, excessive bleeding, any other unexpected problem, or an injury to your surgery area.    Follow-up Appointment: please call    [] [phone] or [phone] for an appointment at [facility] 4th Floor ENT Clinic with       [] Dr. Simpson or ENT Physician Assistant (PA) on _____________      [] Dr. Simpson or ENT Physician Assistant (PA) on _____________    [x] [phone] or [phone] for an appointment at [facility] with       [x] Dr. Simpson in about 1 week.       [] Dr. Simpson or ENT Nurse on _____________      Please  contact our office or me anytime if you have questions or if I can be of service.    Sincerely,    Richie Simpson MD    If you have questions or concerns during business hours, please call the office where you had surgery:    [facility], ENT Clinic: [phone].    [facility] Clinic: [phone].    If it is an emergency or after business hours and you need to talk with Dr. Simpson, please call:  1) Dr. Simpson's cell phone: [phone]. I will not be able to answer it if I am operating. You can leave a message for me to call you back when I am available. If I don't answer and you need help right away, call the pager    2) Pager : [phone], ask for Dr. Simpson to be paged. If Dr. Simpson not available, ask to talk to the ENT resident  on call.

## 2022-02-03 NOTE — PROGRESS NOTES
CLINICAL SUMMARY:   Diagnoses:  Right lateral forehead mass, possible lipoma or cyst.  -2/23/18: CT head- There is a suggestion of a small fat density lesion underneath the marker measuring approximately 4 mm in AP thickness and 7 mm in width. This probably without represents a lipoma although it is always difficult to exclude a low-grade liposarcoma. On image 6, it appears to be anterior to the frontalis muscle.  -2/3/22: excision and complex closure (path = pending).      Comorbidities: rosacea  Pertinent medications: ASA prn pain only  Photographs: [facility] consents signed February 22, 2018.   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    Care Checklist:   _Discuss left parietal lobe CT developmental venous anomaly with PCP. Update 2/3/22: he did not talk with his PCP and I again encouraged him to discuss it with his PCP because I am unsure of the significance.   _Path from 02/03/22  _RTC 1 week for suture removal  _Pain management: Tylenol and Ibuprofen. Oxycodone for breakthrough pain.    Richie Simpson MD

## 2022-02-03 NOTE — OP NOTE
Date: 2/3/22    Surgeon: Richie Simpson MD    Preoperative diagnosis:   Skin mass, right forehead, measuring [id number].    Postoperative diagnosis: same.    Procedure:  Wide local excision of right forehead skin mass and overlying skin [id number].  Complex closure, right forehead wound measuring 30mm in length.    Operative Findings: fatty mass consistent with a lipoma removed in one complete piece.    A preoperative discussion was held. Mr. Olmos stated he understood the risks, benefits, alternatives, and limitations of the procedure. The risks, including but not limited to, bleeding, infection, damage to surrounding structures, facial nerve damage, chronic pain, sensation loss, scarring, positive margins, need for further resection or revision surgeries, regrowth of the mass, or unforseen complications related to surgery were discussed. He stated that his questions were answered to his satisfaction. He wished to undergo the procedure.    Procedure: the patient was marked for the proposed excision oriented vertically. This was shown to the patient in the mirror and he agreed with this plan.     After informed consent was obtained and placed in chart, the patient was brought to the operating room, placed in supine position.  The patient was prepped and draped in the standard sterile fashion, which included injection of 1% lidocaine with 1:100,000 epinephrine at the proposed operative site.     A time out was performed. The correct patient and procedure were identified.    The procedure began with excision of the right forehead mass. The skin overlying the mass was excised in a fusiform fashion oriented vertically to avoid eyebrow elevation and also include any superficial attachment to the skin with a 15 blade scalpel. The deep aspect was dissected with an iris scissors in the subcutaneous plane and then along the visible edge of the mass. The fatty mass was a lighter color than the surrounding fat and seemed thinly  encapsulated.  The mass was removed in one complete piece without violating the capsule. Complete hemostasis was obtained with a bipolar cautery. The specimen was sent for routine pathology.     Attention then turned towards closure of this complex wound. The periphery was undermined significantly with an iris scissors. The wound was then closed in multiple layers with multiple interrupted 5-0 vicryl sutures in the deep layer and running subcuticular 5-0 prolene suture in the superficial layer. Antibiotic ointment was applied over the wound site followed by a light pressure dressing.      Postoperative instructions were provided. He should follow up next week for suture removal. I encouraged him to call or return sooner if he has questions or if problems arise.     Richie Simpson MD

## 2022-02-07 LAB
PATH REPORT.COMMENTS IMP SPEC: NORMAL
PATH REPORT.COMMENTS IMP SPEC: NORMAL
PATH REPORT.FINAL DX SPEC: NORMAL
PATH REPORT.GROSS SPEC: NORMAL
PATH REPORT.MICROSCOPIC SPEC OTHER STN: NORMAL
PATH REPORT.RELEVANT HX SPEC: NORMAL
PHOTO IMAGE: NORMAL

## 2022-02-09 NOTE — PROGRESS NOTES
CLINICAL SUMMARY:   Diagnoses:  Right lateral forehead mass, possible lipoma or cyst.  -2/23/18: CT head- There is a suggestion of a small fat density lesion underneath the marker measuring approximately 4 mm in AP thickness and 7 mm in width. This probably without represents a lipoma although it is always difficult to exclude a low-grade liposarcoma. On image 6, it appears to be anterior to the frontalis muscle.  -2/3/22: excision and complex closure (path = intramuscular lipoma).      Comorbidities: rosacea  Pertinent medications: ASA prn pain only  Photographs: [facility] consents signed February 22, 2018.   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    Care Checklist:   _RTC 6-8 weeks  _Pain 0/10. Pain management: Tylenol and Ibuprofen. Oxycodone for breakthrough pain.        Facial Plastic and Reconstructive Surgery Note    Today I had the pleasure of seeing the patient at the Facial Plastic and Reconstructive Surgery Clinic in the Department of Otolaryngology at [facility]. He follows up after undergoing lesion excision. The patient has been doing well. No pain, bleeding or discharge from the wound.    Physical examination:   The tissue at the right forehead excision and reconstruction site is 100% viable with adequate color and capillary refill. The incision is clean, dry, and intact. No evidence of hematoma or infection. Facial nerve is strong and symmetric HB 1/6 bilaterally.     Pathology from the excision was reviewed in Epic. No evidence of malignancy.    Impression and Recommendations:  Right forehead lesion status post excision and reconstruction one week ago. Continue with routine wound care. May shower and get the wound wet but no scrubbing. We discussed the importance of sun protection especially at the reconstruction site. He should follow up with me in 6-8 weeks for continued monitoring. I encouraged him to call or return sooner if questions arise or if I can be of service.  Richie Simpson MD            Case Report   Surgical Pathology Report                         Case: [id number]                                   Authorizing Provider:  Richie Simpson MD       Collected:           02/03/2022 01:13 PM           Ordering Location:     [facility]    Received:            02/03/2022 02:11 PM                                  [place]                                                                      Pathologist:           Elizabeth Smith MD                                                                Specimen:    Face, RIGHT FOREHEAD MASS AND OVERLYING SKIN                                               Final Diagnosis   Right forehead mass, excision:  -Mature adipose tissue with skeletal muscle, consistent with intramuscular lipoma  -Benign skin with solar elastosis

## 2022-02-10 ENCOUNTER — OFFICE VISIT (OUTPATIENT)
Dept: OTOLARYNGOLOGY | Facility: CLINIC | Age: 62
End: 2022-02-10
Payer: COMMERCIAL

## 2022-02-10 DIAGNOSIS — L98.9 SKIN LESION: Primary | ICD-10-CM

## 2022-02-10 PROCEDURE — 99024 POSTOP FOLLOW-UP VISIT: CPT | Performed by: OTOLARYNGOLOGY

## 2022-02-10 ASSESSMENT — PAIN SCALES - GENERAL: PAINLEVEL: NO PAIN (0)

## 2022-02-10 NOTE — LETTER
2/10/2022         RE: Lopez Olmos  [address] [id number]        Dear Colleague,    Thank you for referring your patient, Lopez Olmos, to the [facility]. Please see a copy of my visit note below.    CLINICAL SUMMARY:   Diagnoses:  Right lateral forehead mass, possible lipoma or cyst.  -2/23/18: CT head- There is a suggestion of a small fat density lesion underneath the marker measuring approximately 4 mm in AP thickness and 7 mm in width. This probably without represents a lipoma although it is always difficult to exclude a low-grade liposarcoma. On image 6, it appears to be anterior to the frontalis muscle.  -2/3/22: excision and complex closure (path = intramuscular lipoma).      Comorbidities: rosacea  Pertinent medications: ASA prn pain only  Photographs: [facility] consents signed February 22, 2018.   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    Care Checklist:   _RTC 6-8 weeks  _Pain 0/10. Pain management: Tylenol and Ibuprofen. Oxycodone for breakthrough pain.        Facial Plastic and Reconstructive Surgery Note    Today I had the pleasure of seeing the patient at the Facial Plastic and Reconstructive Surgery Clinic in the Department of Otolaryngology at [facility]. He follows up after undergoing lesion excision. The patient has been doing well. No pain, bleeding or discharge from the wound.    Physical examination:   The tissue at the right forehead excision and reconstruction site is 100% viable with adequate color and capillary refill. The incision is clean, dry, and intact. No evidence of hematoma or infection. Facial nerve is strong and symmetric HB 1/6 bilaterally.     Pathology from the excision was reviewed in Epic. No evidence of malignancy.    Impression and Recommendations:  Right forehead lesion status post excision and reconstruction one week ago. Continue with routine wound care. May shower and get the wound wet but no  scrubbing. We discussed the importance of sun protection especially at the reconstruction site. He should follow up with me in 6-8 weeks for continued monitoring. I encouraged him to call or return sooner if questions arise or if I can be of service. Richie Simpson MD            Case Report   Surgical Pathology Report                         Case: [id number]                                   Authorizing Provider:  Richie Simpson MD       Collected:           02/03/2022 01:13 PM           Ordering Location:     [facility]    Received:            02/03/2022 02:11 PM                                  [place]                                                                      Pathologist:           Elizabeth Smith MD                                                                Specimen:    Face, RIGHT FOREHEAD MASS AND OVERLYING SKIN                                               Final Diagnosis   Right forehead mass, excision:  -Mature adipose tissue with skeletal muscle, consistent with intramuscular lipoma  -Benign skin with solar elastosis           Again, thank you for allowing me to participate in the care of your patient.        Sincerely,        Richie Simpson MD

## 2022-02-10 NOTE — NURSING NOTE
Lopez Olmos's goals for this visit include:   Chief Complaint   Patient presents with     Wound Check     1 week postop wound check       He requests these members of his care team be copied on today's visit information: no      PCP: No Ref-Primary, Physician    Referring Provider:  No referring provider defined for this encounter.    There were no vitals taken for this visit.    Do you need any medication refills at today's visit? No    Moraima Patrick RN

## 2022-02-19 ENCOUNTER — HEALTH MAINTENANCE LETTER (OUTPATIENT)
Age: 62
End: 2022-02-19

## 2022-03-24 NOTE — PROGRESS NOTES
CLINICAL SUMMARY:   Diagnoses:  Right lateral forehead mass, possible lipoma or cyst.  -2/23/18: CT head- There is a suggestion of a small fat density lesion underneath the marker measuring approximately 4 mm in AP thickness and 7 mm in width. This probably without represents a lipoma although it is always difficult to exclude a low-grade liposarcoma. On image 6, it appears to be anterior to the frontalis muscle.  -2/3/22: excision and complex closure (path = intramuscular lipoma).      Comorbidities: rosacea  Pertinent medications: ASA prn pain only  Photographs: [facility] consents signed February 22, 2018.   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    Care Checklist:   _RTC 6-8 weeks  _Pain 0/10. Pain management: Tylenol and Ibuprofen. Oxycodone for breakthrough pain.      Facial Plastic and Reconstructive Surgery Note    Today I had the pleasure of seeing the patient at the Facial Plastic and Reconstructive Surgery Clinic in the Department of Otolaryngology at [facility]. He follows up after undergoing lesion excision. The patient has been doing well. No pain, bleeding or discharge from the wound. He is very satisfied with the reconstruction site.    Physical examination:   The tissue at the right forehead excision and reconstruction site is 100% viable with adequate color and capillary refill. The incision is clean, dry, and intact. No evidence of hematoma or infection.     IMPRESSION AND RECOMMENDATIONS:  Right forehead lesion status post excision and reconstruction.  Pathology showed the presence of a benign lipoma.  Both he and I are very satisfied with the reconstruction. We discussed the importance of sun protection especially at the reconstruction site. He should follow up with me on an as needed basis. I encouraged him to call or return anytime if he has questions or if I can be of service. It has been a pleasure to participate in the care of Mr. Olmos.    Richie Simpson,  MD  Facial Plastic and Reconstructive Surgery  Department of Otolaryngology  [facility]

## 2022-03-31 ENCOUNTER — OFFICE VISIT (OUTPATIENT)
Dept: OTOLARYNGOLOGY | Facility: CLINIC | Age: 62
End: 2022-03-31
Payer: COMMERCIAL

## 2022-03-31 DIAGNOSIS — L98.9 SKIN LESION: Primary | ICD-10-CM

## 2022-03-31 PROCEDURE — 99024 POSTOP FOLLOW-UP VISIT: CPT | Performed by: OTOLARYNGOLOGY

## 2022-03-31 NOTE — NURSING NOTE
Lopez Olmos's chief complaint for this visit includes:  Chief Complaint   Patient presents with     Surgical Followup     8 weeks S/p Right forehead lesion excision, complex closure DOS: 2/3/2022     PCP: No Ref-Primary, Physician    Referring Provider:   [place]  No referring provider defined for this encounter.    There were no vitals taken for this visit.  Data Unavailable      No Known Allergies      Do you need any medication refills at today's visit?

## 2022-03-31 NOTE — LETTER
3/31/2022         RE: Lopez Olmos  [address] [address]        Dear Colleague,    Thank you for referring your patient, Lopez Olmos, to the [facility]. Please see a copy of my visit note below.    CLINICAL SUMMARY:   Diagnoses:  Right lateral forehead mass, possible lipoma or cyst.  -2/23/18: CT head- There is a suggestion of a small fat density lesion underneath the marker measuring approximately 4 mm in AP thickness and 7 mm in width. This probably without represents a lipoma although it is always difficult to exclude a low-grade liposarcoma. On image 6, it appears to be anterior to the frontalis muscle.  -2/3/22: excision and complex closure (path = intramuscular lipoma).      Comorbidities: rosacea  Pertinent medications: ASA prn pain only  Photographs: [facility] consents signed February 22, 2018.   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    Care Checklist:   _RTC 6-8 weeks  _Pain 0/10. Pain management: Tylenol and Ibuprofen. Oxycodone for breakthrough pain.      Facial Plastic and Reconstructive Surgery Note    Today I had the pleasure of seeing the patient at the Facial Plastic and Reconstructive Surgery Clinic in the Department of Otolaryngology at [facility]. He follows up after undergoing lesion excision. The patient has been doing well. No pain, bleeding or discharge from the wound. He is very satisfied with the reconstruction site.    Physical examination:   The tissue at the right forehead excision and reconstruction site is 100% viable with adequate color and capillary refill. The incision is clean, dry, and intact. No evidence of hematoma or infection.     IMPRESSION AND RECOMMENDATIONS:  Right forehead lesion status post excision and reconstruction.  Pathology showed the presence of a benign lipoma.  Both he and I are very satisfied with the reconstruction. We discussed the importance of sun protection especially at the  reconstruction site. He should follow up with me on an as needed basis. I encouraged him to call or return anytime if he has questions or if I can be of service. It has been a pleasure to participate in the care of Mr. Olmos.    Richie Simpson MD  Facial Plastic and Reconstructive Surgery  Department of Otolaryngology  [facility]         Again, thank you for allowing me to participate in the care of your patient.        Sincerely,        Richie Simpson MD

## 2022-07-13 ENCOUNTER — HOSPITAL ENCOUNTER (EMERGENCY)
Facility: CLINIC | Age: 62
Discharge: HOME | End: 2022-07-13
Attending: EMERGENCY MEDICINE | Admitting: EMERGENCY MEDICINE
Payer: COMMERCIAL

## 2022-07-13 VITALS
TEMPERATURE: 97.8 F | OXYGEN SATURATION: 99 % | HEART RATE: 65 BPM | SYSTOLIC BLOOD PRESSURE: 106 MMHG | BODY MASS INDEX: 27.92 KG/M2 | RESPIRATION RATE: 16 BRPM | WEIGHT: 195 LBS | HEIGHT: 70 IN | DIASTOLIC BLOOD PRESSURE: 83 MMHG

## 2022-07-13 DIAGNOSIS — M54.50 ACUTE MIDLINE LOW BACK PAIN WITHOUT SCIATICA: ICD-10-CM
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
## 2022-07-13 DIAGNOSIS — Z53.29 LEFT AGAINST MEDICAL ADVICE: ICD-10-CM

## 2022-07-13 LAB
ALBUMIN SERPL-MCNC: 3.7 G/DL (ref 3.4–5)
ALP SERPL-CCNC: 54 U/L (ref 40–150)
ALT SERPL W P-5'-P-CCNC: 20 U/L (ref 0–70)
ANION GAP SERPL CALCULATED.3IONS-SCNC: 6 MMOL/L (ref 3–14)
AST SERPL W P-5'-P-CCNC: 11 U/L (ref 0–45)
BASOPHILS # BLD AUTO: 0.1 10E3/UL (ref 0–0.2)
BASOPHILS NFR BLD AUTO: 1 %
BILIRUB SERPL-MCNC: 0.4 MG/DL (ref 0.2–1.3)
BUN SERPL-MCNC: 16 MG/DL (ref 7–30)
CALCIUM SERPL-MCNC: 9 MG/DL (ref 8.5–10.1)
CHLORIDE BLD-SCNC: 104 MMOL/L (ref 94–109)
CO2 SERPL-SCNC: 26 MMOL/L (ref 20–32)
CREAT SERPL-MCNC: 0.93 MG/DL (ref 0.66–1.25)
EOSINOPHIL # BLD AUTO: 0.3 10E3/UL (ref 0–0.7)
EOSINOPHIL NFR BLD AUTO: 4 %
ERYTHROCYTE [DISTWIDTH] IN BLOOD BY AUTOMATED COUNT: 11.7 % (ref 10–15)
GFR SERPL CREATININE-BSD FRML MDRD: >90 ML/MIN/1.73M2
GLUCOSE BLD-MCNC: 91 MG/DL (ref 70–99)
HCT VFR BLD AUTO: 42.1 % (ref 40–53)
HGB BLD-MCNC: 14.1 G/DL (ref 13.3–17.7)
IMM GRANULOCYTES # BLD: 0 10E3/UL
IMM GRANULOCYTES NFR BLD: 0 %
LYMPHOCYTES # BLD AUTO: 1.9 10E3/UL (ref 0.8–5.3)
LYMPHOCYTES NFR BLD AUTO: 26 %
MCH RBC QN AUTO: 29.3 PG (ref 26.5–33)
MCHC RBC AUTO-ENTMCNC: 33.5 G/DL (ref 31.5–36.5)
MCV RBC AUTO: 88 FL (ref 78–100)
MONOCYTES # BLD AUTO: 0.4 10E3/UL (ref 0–1.3)
MONOCYTES NFR BLD AUTO: 6 %
NEUTROPHILS # BLD AUTO: 4.5 10E3/UL (ref 1.6–8.3)
NEUTROPHILS NFR BLD AUTO: 63 %
NRBC # BLD AUTO: 0 10E3/UL
NRBC BLD AUTO-RTO: 0 /100
PLATELET # BLD AUTO: 322 10E3/UL (ref 150–450)
POTASSIUM BLD-SCNC: 3.8 MMOL/L (ref 3.4–5.3)
PROT SERPL-MCNC: 7.6 G/DL (ref 6.8–8.8)
RBC # BLD AUTO: 4.81 10E6/UL (ref 4.4–5.9)
SODIUM SERPL-SCNC: 136 MMOL/L (ref 133–144)
WBC # BLD AUTO: 7.1 10E3/UL (ref 4–11)

## 2022-07-13 PROCEDURE — 85025 COMPLETE CBC W/AUTO DIFF WBC: CPT | Performed by: EMERGENCY MEDICINE

## 2022-07-13 PROCEDURE — 99284 EMERGENCY DEPT VISIT MOD MDM: CPT | Mod: 25

## 2022-07-13 PROCEDURE — 36415 COLL VENOUS BLD VENIPUNCTURE: CPT | Performed by: EMERGENCY MEDICINE

## 2022-07-13 PROCEDURE — 80053 COMPREHEN METABOLIC PANEL: CPT | Performed by: EMERGENCY MEDICINE

## 2022-07-13 PROCEDURE — 258N000003 HC RX IP 258 OP 636: Performed by: EMERGENCY MEDICINE

## 2022-07-13 RX ORDER — LIDOCAINE 40 MG/G
CREAM TOPICAL
Status: DISCONTINUED | OUTPATIENT
Start: 2022-07-13 | End: 2022-07-13 | Stop reason: HOSPADM

## 2022-07-13 RX ORDER — SODIUM CHLORIDE 9 MG/ML
INJECTION, SOLUTION INTRAVENOUS CONTINUOUS
Status: DISCONTINUED | OUTPATIENT
Start: 2022-07-13 | End: 2022-07-13 | Stop reason: HOSPADM

## 2022-07-13 RX ADMIN — SODIUM CHLORIDE 1000 ML: 9 INJECTION, SOLUTION INTRAVENOUS at 20:03

## 2022-07-13 ASSESSMENT — ENCOUNTER SYMPTOMS
NECK PAIN: 0
VOMITING: 1
ARTHRALGIAS: 0
BACK PAIN: 1

## 2022-07-14 ENCOUNTER — HOSPITAL ENCOUNTER (EMERGENCY)
Facility: CLINIC | Age: 62
Discharge: HOME | End: 2022-07-15
Attending: EMERGENCY MEDICINE | Admitting: EMERGENCY MEDICINE
Payer: COMMERCIAL

## 2022-07-14 ENCOUNTER — APPOINTMENT (OUTPATIENT)
Dept: CT IMAGING | Facility: CLINIC | Age: 62
End: 2022-07-14
Attending: EMERGENCY MEDICINE
Payer: COMMERCIAL
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
## 2022-07-14 DIAGNOSIS — M54.42 ACUTE LEFT-SIDED LOW BACK PAIN WITH LEFT-SIDED SCIATICA: ICD-10-CM

## 2022-07-14 PROCEDURE — 250N000013 HC RX MED GY IP 250 OP 250 PS 637: Performed by: EMERGENCY MEDICINE

## 2022-07-14 PROCEDURE — 99284 EMERGENCY DEPT VISIT MOD MDM: CPT | Mod: 25

## 2022-07-14 PROCEDURE — 250N000012 HC RX MED GY IP 250 OP 636 PS 637: Performed by: EMERGENCY MEDICINE

## 2022-07-14 PROCEDURE — 72128 CT CHEST SPINE W/O DYE: CPT

## 2022-07-14 PROCEDURE — 72131 CT LUMBAR SPINE W/O DYE: CPT

## 2022-07-14 RX ORDER — IBUPROFEN 600 MG/1
600 TABLET, FILM COATED ORAL ONCE
Status: COMPLETED | OUTPATIENT
Start: 2022-07-14 | End: 2022-07-14

## 2022-07-14 RX ORDER — LIDOCAINE 4 G/G
1 PATCH TOPICAL ONCE
Status: DISCONTINUED | OUTPATIENT
Start: 2022-07-14 | End: 2022-07-15 | Stop reason: HOSPADM

## 2022-07-14 RX ORDER — METHYLPREDNISOLONE 16 MG/1
32 TABLET ORAL ONCE
Status: COMPLETED | OUTPATIENT
Start: 2022-07-14 | End: 2022-07-14

## 2022-07-14 RX ADMIN — IBUPROFEN 600 MG: 600 TABLET ORAL at 23:08

## 2022-07-14 RX ADMIN — METHYLPREDNISOLONE 32 MG: 16 TABLET ORAL at 23:08

## 2022-07-14 RX ADMIN — LIDOCAINE 1 PATCH: 560 PATCH PERCUTANEOUS; TOPICAL; TRANSDERMAL at 23:10
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
## 2022-07-15 VITALS
HEART RATE: 65 BPM | TEMPERATURE: 98.5 F | SYSTOLIC BLOOD PRESSURE: 144 MMHG | RESPIRATION RATE: 18 BRPM | DIASTOLIC BLOOD PRESSURE: 97 MMHG | OXYGEN SATURATION: 98 %

## 2022-07-15 ASSESSMENT — ENCOUNTER SYMPTOMS: BACK PAIN: 1
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
## 2022-07-15 NOTE — DISCHARGE INSTRUCTIONS
If your symptoms persist, please follow up with your doctor to discuss physical therapy.  Continue with tylenol along with either naprosyn or ibuprofen.  You can also purchase lidocaine patches for your back.

## 2022-07-15 NOTE — ED TRIAGE NOTES
Patient here with back pain. He was seen here last but went home. Now he is here to have a CT scan done which he refused last night

## 2022-10-16 ENCOUNTER — HEALTH MAINTENANCE LETTER (OUTPATIENT)
Age: 62
End: 2022-10-16

## 2022-11-03 ENCOUNTER — RX ONLY (RX ONLY)
Age: 62
End: 2022-11-03

## 2022-11-03 RX ORDER — MINOCYCLINE HYDROCHLORIDE 50 MG/1
CAPSULE ORAL
Qty: 180 | Refills: 0 | Status: ERX

## 2023-04-01 ENCOUNTER — HEALTH MAINTENANCE LETTER (OUTPATIENT)
Age: 63
End: 2023-04-01

## 2024-06-01 ENCOUNTER — HEALTH MAINTENANCE LETTER (OUTPATIENT)
Age: 64
End: 2024-06-01

## 2025-02-23 ENCOUNTER — HEALTH MAINTENANCE LETTER (OUTPATIENT)
Age: 65
End: 2025-02-23

## (undated) DEVICE — ESU HOLSTER PLASTIC DISP E2400

## (undated) DEVICE — PACK MINOR SBA15MIFSE

## (undated) DEVICE — NDL 19GA 1.5"

## (undated) DEVICE — DRAPE SPLIT EENT 76X124" 3X28" 9447

## (undated) DEVICE — ESU GROUND PAD ADULT W/CORD E7507

## (undated) DEVICE — NDL 30GA 1" 305128

## (undated) DEVICE — GLOVE PROTEXIS W/NEU-THERA 7.5  2D73TE75

## (undated) DEVICE — BLADE KNIFE BEAVER MICROSHARP BLUE 7514

## (undated) DEVICE — GLOVE PROTEXIS W/NEU-THERA 7.0  2D73TE70

## (undated) DEVICE — DRAPE IOBAN INCISE 23X17" 6650EZ

## (undated) DEVICE — SOL WATER IRRIG 1000ML BOTTLE 07139-09

## (undated) DEVICE — STPL SKIN 35W 054887

## (undated) RX ORDER — LIDOCAINE HYDROCHLORIDE 10 MG/ML
INJECTION, SOLUTION EPIDURAL; INFILTRATION; INTRACAUDAL; PERINEURAL
Status: DISPENSED
Start: 2022-02-03

## (undated) RX ORDER — MUPIROCIN 20 MG/G
OINTMENT TOPICAL
Status: DISPENSED
Start: 2022-02-03